# Patient Record
Sex: FEMALE | Race: WHITE | NOT HISPANIC OR LATINO | Employment: FULL TIME | URBAN - METROPOLITAN AREA
[De-identification: names, ages, dates, MRNs, and addresses within clinical notes are randomized per-mention and may not be internally consistent; named-entity substitution may affect disease eponyms.]

---

## 2023-01-11 NOTE — PROGRESS NOTES
EMG and Nerve Conduction Studies    The complete report includes the data sheets.     Date of Study: 1/13/23    Referring Provider:DR. CURTIS        History:    BUE-CTS    Results:    Prior to starting the procedure, the patient's identity was verified, pertinent available records were reviewed, the nature of the procedure was explained, the appropriate site of the exam were confirmed directly with the patient, and a pre-procedure pause was performed for final verification of all the above.    1.  The median sensory and motor distal latencies were normal bilaterally.  However, the median Palmar distal latency was mildly prolonged bilaterally and significantly prolonged compared to the ulnar palmar latencies which were within normal limits.  The median motor forearm conduction velocity was normal bilaterally.    2.  The ulnar sensory and motor nerve conduction studies were normal bilaterally.    3.  EMG of the muscles examined in the bilateral C5-T1 myotomes were normal.    Impression:    This is an abnormal study.  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  There is no evidence of focal ulnar neuropathy at the elbow or neurogenic change in the muscles examined in the bilateral C5-T1 myotomes.      Electronically signed by :    Joseph Seipel, M.D.  January 16, 2023

## 2023-01-13 ENCOUNTER — PROCEDURE VISIT (OUTPATIENT)
Dept: NEUROLOGY | Facility: CLINIC | Age: 28
End: 2023-01-13
Payer: COMMERCIAL

## 2023-01-13 VITALS
HEIGHT: 59 IN | DIASTOLIC BLOOD PRESSURE: 81 MMHG | SYSTOLIC BLOOD PRESSURE: 122 MMHG | HEART RATE: 81 BPM | BODY MASS INDEX: 31.85 KG/M2 | TEMPERATURE: 98 F | WEIGHT: 158 LBS

## 2023-01-13 DIAGNOSIS — G56.03 CARPAL TUNNEL SYNDROME, BILATERAL: Primary | ICD-10-CM

## 2023-01-13 PROCEDURE — 95886 MUSC TEST DONE W/N TEST COMP: CPT | Performed by: PSYCHIATRY & NEUROLOGY

## 2023-01-13 PROCEDURE — 95910 NRV CNDJ TEST 7-8 STUDIES: CPT | Performed by: PSYCHIATRY & NEUROLOGY

## 2023-01-13 RX ORDER — BUSPIRONE HYDROCHLORIDE 15 MG/1
TABLET ORAL
COMMUNITY
Start: 2022-07-20

## 2023-01-13 RX ORDER — DOXYLAMINE SUCCINATE AND PYRIDOXINE HYDROCHLORIDE, DELAYED RELEASE TABLETS 10 MG/10 MG 10; 10 MG/1; MG/1
TABLET, DELAYED RELEASE ORAL DAILY
COMMUNITY

## 2023-01-13 RX ORDER — FERROUS SULFATE 325(65) MG
325 TABLET ORAL DAILY
COMMUNITY

## 2023-01-13 RX ORDER — SERTRALINE HYDROCHLORIDE 100 MG/1
100 TABLET, FILM COATED ORAL
COMMUNITY
Start: 2022-12-28

## 2023-01-13 RX ORDER — TRAZODONE HYDROCHLORIDE 50 MG/1
50 TABLET ORAL
COMMUNITY
Start: 2022-12-28

## 2023-01-13 RX ORDER — ONDANSETRON 4 MG/1
4 TABLET, ORALLY DISINTEGRATING ORAL
COMMUNITY

## 2023-01-13 RX ORDER — GLYBURIDE 2.5 MG/1
5 TABLET ORAL
COMMUNITY

## 2023-01-13 RX ORDER — HYDROXYZINE HYDROCHLORIDE 10 MG/1
TABLET, FILM COATED ORAL
COMMUNITY
Start: 2023-01-09

## 2023-01-16 PROBLEM — G56.03 CARPAL TUNNEL SYNDROME, BILATERAL: Status: ACTIVE | Noted: 2023-01-16

## 2023-10-21 ENCOUNTER — HOSPITAL ENCOUNTER (OUTPATIENT)
Facility: HOSPITAL | Age: 28
Discharge: HOME | End: 2023-10-21
Attending: PEDIATRICS
Payer: MEDICAID

## 2023-10-21 VITALS
DIASTOLIC BLOOD PRESSURE: 77 MMHG | RESPIRATION RATE: 17 BRPM | HEIGHT: 59 IN | OXYGEN SATURATION: 98 % | WEIGHT: 160 LBS | SYSTOLIC BLOOD PRESSURE: 127 MMHG | HEART RATE: 88 BPM | TEMPERATURE: 98.7 F | BODY MASS INDEX: 32.25 KG/M2

## 2023-10-21 DIAGNOSIS — J06.9 UPPER RESPIRATORY TRACT INFECTION, UNSPECIFIED TYPE: Primary | ICD-10-CM

## 2023-10-21 LAB
FLUAV SUBTYP SPEC NAA+PROBE: NOT DETECTED
FLUBV RNA ISLT QL NAA+PROBE: NOT DETECTED
HETEROPH AB SER QL LA: NEGATIVE
SARS-COV-2 RNA RESP QL NAA+PROBE: NOT DETECTED
STREP A PCR: NOT DETECTED

## 2023-10-21 PROCEDURE — 25010000002 DEXAMETHASONE PER 1 MG: Performed by: PEDIATRICS

## 2023-10-21 PROCEDURE — 63710000001 ONDANSETRON ODT 4 MG TABLET DISPERSIBLE: Performed by: PEDIATRICS

## 2023-10-21 PROCEDURE — 87651 STREP A DNA AMP PROBE: CPT | Performed by: PEDIATRICS

## 2023-10-21 PROCEDURE — G0463 HOSPITAL OUTPT CLINIC VISIT: HCPCS | Performed by: PEDIATRICS

## 2023-10-21 PROCEDURE — 86308 HETEROPHILE ANTIBODY SCREEN: CPT | Performed by: PEDIATRICS

## 2023-10-21 PROCEDURE — 87636 SARSCOV2 & INF A&B AMP PRB: CPT | Performed by: PEDIATRICS

## 2023-10-21 RX ORDER — IPRATROPIUM BROMIDE AND ALBUTEROL SULFATE 2.5; .5 MG/3ML; MG/3ML
3 SOLUTION RESPIRATORY (INHALATION) ONCE
Status: COMPLETED | OUTPATIENT
Start: 2023-10-21 | End: 2023-10-21

## 2023-10-21 RX ORDER — ONDANSETRON 4 MG/1
4 TABLET, ORALLY DISINTEGRATING ORAL ONCE
Status: COMPLETED | OUTPATIENT
Start: 2023-10-21 | End: 2023-10-21

## 2023-10-21 RX ORDER — ONDANSETRON 4 MG/1
4 TABLET, ORALLY DISINTEGRATING ORAL EVERY 8 HOURS PRN
Qty: 30 TABLET | Refills: 0 | Status: SHIPPED | OUTPATIENT
Start: 2023-10-21 | End: 2023-10-31

## 2023-10-21 RX ORDER — ALBUTEROL SULFATE 90 UG/1
2 AEROSOL, METERED RESPIRATORY (INHALATION) EVERY 4 HOURS PRN
Qty: 18 G | Refills: 0 | Status: SHIPPED | OUTPATIENT
Start: 2023-10-21

## 2023-10-21 RX ORDER — BENZONATATE 200 MG/1
200 CAPSULE ORAL 3 TIMES DAILY PRN
Qty: 30 CAPSULE | Refills: 0 | Status: SHIPPED | OUTPATIENT
Start: 2023-10-21 | End: 2023-10-31

## 2023-10-21 RX ADMIN — ONDANSETRON 4 MG: 4 TABLET, ORALLY DISINTEGRATING ORAL at 10:48

## 2023-10-21 RX ADMIN — IPRATROPIUM BROMIDE AND ALBUTEROL SULFATE 3 ML: .5; 3 SOLUTION RESPIRATORY (INHALATION) at 10:48

## 2023-10-21 RX ADMIN — DEXAMETHASONE SODIUM PHOSPHATE 10 MG: 10 INJECTION, SOLUTION INTRAMUSCULAR; INTRAVENOUS at 11:29

## 2023-10-21 NOTE — DISCHARGE INSTRUCTIONS
Kaylan Fernández can safely take 1000 mg of acetaminophen (Tylenol) and 800 mg of ibuprofen (Motrin) every six hours as needed.

## 2023-10-21 NOTE — FSED PROVIDER NOTE
Emergency Medicine Evaluation Note  Subjective   History of Present Illness    HPI: Kaylan Fernández is a 28 y.o. female who presents to the ED with complaints of nonproductive cough, sore throat for roughly 1 week with development of nausea without vomiting over the past day.  Patient denies any urinary vaginal or bowel symptoms.  Past medical history as described below.  No recent OTC medications prior arrival. No other concomitant symptoms. No other known aggravating or alleviating factors.      ROS  Review of Systems   Constitutional: Negative.  Negative for chills and fever.   HENT:  Positive for congestion and sore throat. Negative for ear discharge, ear pain, facial swelling, sinus pressure, sinus pain, trouble swallowing and voice change.    Eyes: Negative.    Respiratory:  Positive for cough. Negative for choking, chest tightness, shortness of breath, wheezing and stridor.    Cardiovascular: Negative.  Negative for chest pain and palpitations.   Gastrointestinal:  Positive for nausea. Negative for abdominal pain, constipation, diarrhea and vomiting.   Endocrine: Negative.    Genitourinary: Negative.    Musculoskeletal: Negative.    Skin: Negative.    Allergic/Immunologic: Negative.    Neurological: Negative.    Hematological: Negative.    Psychiatric/Behavioral: Negative.         Previous History:  No past medical history on file.  No past surgical history on file.     No family history on file.  No Known Allergies  Current Outpatient Medications   Medication Instructions    albuterol sulfate  (90 Base) MCG/ACT inhaler 2 puffs, Inhalation, Every 4 Hours PRN    benzonatate (TESSALON) 200 mg, Oral, 3 Times Daily PRN    busPIRone (BUSPAR) 15 MG tablet No dose, route, or frequency recorded.    doxylamine-pyridoxine (DICLEGIS) 10-10 MG tablet delayed-release EC tablet Oral, Daily    ferrous sulfate 325 mg, Oral, Daily    glyburide (DIABETA) 5 mg, Oral    hydrOXYzine (ATARAX) 10 MG tablet No dose, route,  "or frequency recorded.    ondansetron ODT (ZOFRAN-ODT) 4 mg, Oral    ondansetron ODT (ZOFRAN-ODT) 4 mg, Translingual, Every 8 Hours PRN    sertraline (ZOLOFT) 100 mg, Oral, Every Night at Bedtime    traZODone (DESYREL) 50 mg, Oral, Every Night at Bedtime       Objective   Physical Exam  Patient Vitals for the past 24 hrs:   BP Temp Pulse Resp SpO2 Height Weight   10/21/23 1044 127/77 98.7 °F (37.1 °C) 88 17 98 % 149.9 cm (59\") 72.6 kg (160 lb)     Physical Exam  Vitals and nursing note reviewed.   Constitutional:       General: She is not in acute distress.     Appearance: She is normal weight. She is not toxic-appearing.   HENT:      Head: Normocephalic and atraumatic.      Right Ear: Tympanic membrane, ear canal and external ear normal. There is no impacted cerumen.      Left Ear: Tympanic membrane, ear canal and external ear normal. There is no impacted cerumen.      Nose: Nose normal. Congestion (clear) present.      Mouth/Throat:      Mouth: Mucous membranes are moist.      Pharynx: Oropharynx is clear.      Comments: Base of uvula midline.  No intraoral lesions.  Eyes:      General:         Right eye: No discharge.         Left eye: No discharge.      Extraocular Movements: Extraocular movements intact.      Conjunctiva/sclera: Conjunctivae normal.   Cardiovascular:      Rate and Rhythm: Normal rate and regular rhythm.      Pulses: Normal pulses.      Heart sounds: Normal heart sounds. No murmur heard.  Pulmonary:      Effort: Pulmonary effort is normal. No respiratory distress.      Breath sounds: No rhonchi or rales.      Comments: Scant intermittent wheeze without prolonged expiratory ratio.  No accessory muscle use.  No conversational dyspnea.  Chest:      Chest wall: No tenderness.   Abdominal:      General: Abdomen is flat. There is no distension.      Palpations: Abdomen is soft.      Tenderness: There is no abdominal tenderness. There is no right CVA tenderness, left CVA tenderness, guarding or " rebound.   Musculoskeletal:         General: No swelling, tenderness, deformity or signs of injury. Normal range of motion.      Cervical back: Normal range of motion and neck supple. No rigidity or tenderness.      Right lower leg: No edema.      Left lower leg: No edema.   Skin:     General: Skin is warm and dry.      Capillary Refill: Capillary refill takes less than 2 seconds.   Neurological:      General: No focal deficit present.      Mental Status: She is alert and oriented to person, place, and time.      Cranial Nerves: No cranial nerve deficit.      Motor: No weakness.      Coordination: Coordination normal.      Gait: Gait normal.      Deep Tendon Reflexes: Reflexes normal.   Psychiatric:         Mood and Affect: Mood normal.         Behavior: Behavior normal.         Results  Labs Reviewed   RAPID STREP A SCREEN - Normal   COVID-19 AND FLU A/B, NP SWAB IN TRANSPORT MEDIA 8-12 HR TAT - Normal    Narrative:     Fact sheet for providers: https://www.fda.gov/media/536579/download    Fact sheet for patients: https://www.[place].gov/media/688658/download    Test performed by PCR.   MONONUCLEOSIS SCREEN - Normal     No orders to display     The laboratory results, imaging results and other diagnostic exam results were reviewed in the EMR.     Procedures  Procedures    Medical Decision Making    Patient presents to the ED as described above.  Vital signs stable and unremarkable.  Physical exam as described above.  Patient treated symptomatically while in department with Zofran and DuoNeb with complete resolution of wheeze, improved work of breathing, ability to tolerate p.o. without difficulty subsequently.  Rapid strep, COVID/flu/mono negative.  Patient be treated symptomatically with dose of Decadron, discussed safe use of prescribed OTC medications for symptomatic care as an outpatient. Patient and family at bedside were informed of results.  They verbalized understanding and agreement with treatment care plan.   Given strict return precautions.  All questions answered.    Diagnosis  Final diagnoses:   Upper respiratory tract infection, unspecified type       Disposition  ED Disposition       ED Disposition   Discharge    Condition   Stable    Comment   --             PATIENT CONNECTION - [facility] [id number] [phone]  Call in 3 days  or primary care physician, for contunity of care    [place] [address] [address] [place] [id number] [phone]  Go to   As needed, If symptoms worsen